# Patient Record
Sex: FEMALE | Employment: OTHER | URBAN - NONMETROPOLITAN AREA
[De-identification: names, ages, dates, MRNs, and addresses within clinical notes are randomized per-mention and may not be internally consistent; named-entity substitution may affect disease eponyms.]

---

## 2020-08-20 ENCOUNTER — VIRTUAL VISIT (OUTPATIENT)
Dept: FAMILY MEDICINE CLINIC | Age: 85
End: 2020-08-20
Payer: MEDICARE

## 2020-08-20 DIAGNOSIS — I10 ESSENTIAL (PRIMARY) HYPERTENSION: ICD-10-CM

## 2020-08-20 DIAGNOSIS — I48.0 PAROXYSMAL ATRIAL FIBRILLATION (HCC): Primary | ICD-10-CM

## 2020-08-20 DIAGNOSIS — F03.90 DEMENTIA WITHOUT BEHAVIORAL DISTURBANCE, UNSPECIFIED DEMENTIA TYPE: ICD-10-CM

## 2020-08-20 DIAGNOSIS — Z86.73 HISTORY OF CVA (CEREBROVASCULAR ACCIDENT): ICD-10-CM

## 2020-08-20 DIAGNOSIS — C44.90 MALIGNANT NEOPLASM OF SKIN: ICD-10-CM

## 2020-08-20 PROCEDURE — 99212 OFFICE O/P EST SF 10 MIN: CPT | Performed by: NURSE PRACTITIONER

## 2020-08-31 PROBLEM — C44.90 MALIGNANT NEOPLASM OF SKIN: Status: ACTIVE | Noted: 2020-08-31

## 2020-08-31 PROBLEM — F03.90 DEMENTIA WITHOUT BEHAVIORAL DISTURBANCE (HCC): Status: ACTIVE | Noted: 2020-08-31

## 2020-08-31 PROBLEM — I48.0 PAROXYSMAL ATRIAL FIBRILLATION (HCC): Status: ACTIVE | Noted: 2020-08-31

## 2020-08-31 PROBLEM — I10 ESSENTIAL (PRIMARY) HYPERTENSION: Status: ACTIVE | Noted: 2020-08-31

## 2020-10-21 ENCOUNTER — HOSPITAL ENCOUNTER (INPATIENT)
Age: 85
LOS: 8 days | Discharge: HOME | DRG: 178 | End: 2020-10-29
Attending: INTERNAL MEDICINE | Admitting: INTERNAL MEDICINE
Payer: MEDICARE

## 2020-10-21 PROBLEM — U07.1 COVID-19: Status: ACTIVE | Noted: 2020-10-21

## 2020-10-21 PROCEDURE — 65270000029 HC RM PRIVATE

## 2020-10-21 PROCEDURE — 65270000034 HC RM STERILE ISOLATION

## 2020-10-21 RX ORDER — SODIUM CHLORIDE 0.9 % (FLUSH) 0.9 %
5-40 SYRINGE (ML) INJECTION AS NEEDED
Status: DISCONTINUED | OUTPATIENT
Start: 2020-10-21 | End: 2020-10-21

## 2020-10-21 RX ORDER — ENOXAPARIN SODIUM 100 MG/ML
30 INJECTION SUBCUTANEOUS DAILY
Status: DISCONTINUED | OUTPATIENT
Start: 2020-10-22 | End: 2020-10-21

## 2020-10-21 RX ORDER — ZINC GLUCONATE 50 MG
100 TABLET ORAL DAILY
Status: DISCONTINUED | OUTPATIENT
Start: 2020-10-22 | End: 2020-10-29 | Stop reason: HOSPADM

## 2020-10-21 RX ORDER — POTASSIUM CHLORIDE 20 MEQ/1
20 TABLET, EXTENDED RELEASE ORAL 2 TIMES DAILY
COMMUNITY

## 2020-10-21 RX ORDER — PROMETHAZINE HYDROCHLORIDE 25 MG/1
12.5 TABLET ORAL
Status: DISCONTINUED | OUTPATIENT
Start: 2020-10-21 | End: 2020-10-29 | Stop reason: HOSPADM

## 2020-10-21 RX ORDER — ATORVASTATIN CALCIUM 10 MG/1
10 TABLET, FILM COATED ORAL DAILY
Status: DISCONTINUED | OUTPATIENT
Start: 2020-10-22 | End: 2020-10-29 | Stop reason: HOSPADM

## 2020-10-21 RX ORDER — MELATONIN
1000 DAILY
COMMUNITY

## 2020-10-21 RX ORDER — MELATONIN 5 MG
10 CAPSULE ORAL
COMMUNITY

## 2020-10-21 RX ORDER — LANOLIN ALCOHOL/MO/W.PET/CERES
400 CREAM (GRAM) TOPICAL DAILY
Status: DISCONTINUED | OUTPATIENT
Start: 2020-10-22 | End: 2020-10-29 | Stop reason: HOSPADM

## 2020-10-21 RX ORDER — CITALOPRAM 20 MG/1
20 TABLET, FILM COATED ORAL DAILY
COMMUNITY

## 2020-10-21 RX ORDER — POTASSIUM CHLORIDE 750 MG/1
20 TABLET, EXTENDED RELEASE ORAL 2 TIMES DAILY
Status: DISCONTINUED | OUTPATIENT
Start: 2020-10-22 | End: 2020-10-22

## 2020-10-21 RX ORDER — ASCORBIC ACID 500 MG
500 TABLET ORAL DAILY
COMMUNITY

## 2020-10-21 RX ORDER — FUROSEMIDE 20 MG/1
20 TABLET ORAL DAILY
Status: DISCONTINUED | OUTPATIENT
Start: 2020-10-22 | End: 2020-10-29 | Stop reason: HOSPADM

## 2020-10-21 RX ORDER — CITALOPRAM 20 MG/1
20 TABLET, FILM COATED ORAL DAILY
Status: DISCONTINUED | OUTPATIENT
Start: 2020-10-22 | End: 2020-10-29 | Stop reason: HOSPADM

## 2020-10-21 RX ORDER — MELATONIN
1000 DAILY
Status: DISCONTINUED | OUTPATIENT
Start: 2020-10-22 | End: 2020-10-29 | Stop reason: HOSPADM

## 2020-10-21 RX ORDER — LANOLIN ALCOHOL/MO/W.PET/CERES
400 CREAM (GRAM) TOPICAL DAILY
COMMUNITY

## 2020-10-21 RX ORDER — POLYETHYLENE GLYCOL 3350 17 G/17G
17 POWDER, FOR SOLUTION ORAL DAILY PRN
Status: DISCONTINUED | OUTPATIENT
Start: 2020-10-21 | End: 2020-10-29 | Stop reason: HOSPADM

## 2020-10-21 RX ORDER — ATORVASTATIN CALCIUM 10 MG/1
10 TABLET, FILM COATED ORAL DAILY
COMMUNITY

## 2020-10-21 RX ORDER — SODIUM CHLORIDE 0.9 % (FLUSH) 0.9 %
5-40 SYRINGE (ML) INJECTION EVERY 8 HOURS
Status: DISCONTINUED | OUTPATIENT
Start: 2020-10-21 | End: 2020-10-23

## 2020-10-21 RX ORDER — ACETAMINOPHEN 325 MG/1
650 TABLET ORAL
Status: DISCONTINUED | OUTPATIENT
Start: 2020-10-21 | End: 2020-10-29 | Stop reason: HOSPADM

## 2020-10-21 RX ORDER — FUROSEMIDE 20 MG/1
20 TABLET ORAL DAILY
COMMUNITY

## 2020-10-21 RX ORDER — ASCORBIC ACID 500 MG
500 TABLET ORAL DAILY
Status: DISCONTINUED | OUTPATIENT
Start: 2020-10-22 | End: 2020-10-29 | Stop reason: HOSPADM

## 2020-10-21 RX ORDER — ACETAMINOPHEN 650 MG/1
650 SUPPOSITORY RECTAL
Status: DISCONTINUED | OUTPATIENT
Start: 2020-10-21 | End: 2020-10-29 | Stop reason: HOSPADM

## 2020-10-21 RX ORDER — MELATONIN 5 MG
5 CAPSULE ORAL
Status: DISCONTINUED | OUTPATIENT
Start: 2020-10-21 | End: 2020-10-22

## 2020-10-21 RX ORDER — ONDANSETRON 2 MG/ML
4 INJECTION INTRAMUSCULAR; INTRAVENOUS
Status: DISCONTINUED | OUTPATIENT
Start: 2020-10-21 | End: 2020-10-29 | Stop reason: HOSPADM

## 2020-10-21 RX ORDER — ZINC GLUCONATE 100 MG
2 TABLET ORAL DAILY
COMMUNITY

## 2020-10-22 LAB
ANION GAP SERPL CALC-SCNC: 8 MMOL/L
BASOPHILS # BLD: 0 K/UL (ref 0–0.1)
BASOPHILS NFR BLD: 0 % (ref 0–2)
BUN SERPL-MCNC: 16 MG/DL (ref 9–21)
BUN/CREAT SERPL: ABNORMAL
CA-I BLD-MCNC: 8.6 MG/DL (ref 8.5–10.5)
CHLORIDE SERPL-SCNC: 98 MMOL/L (ref 94–111)
CO2 SERPL-SCNC: 27 MMOL/L (ref 21–33)
CREAT SERPL-MCNC: <0.15 MG/DL (ref 0.7–1.2)
EOSINOPHIL # BLD: 0 K/UL (ref 0–0.4)
EOSINOPHIL NFR BLD: 1 % (ref 0–5)
ERYTHROCYTE [DISTWIDTH] IN BLOOD BY AUTOMATED COUNT: 15.4 % (ref 11.6–14.5)
GLUCOSE SERPL-MCNC: 96 MG/DL (ref 70–110)
HCT VFR BLD AUTO: 41.5 % (ref 35–45)
HGB BLD-MCNC: 13.4 G/DL (ref 12–16)
IMM GRANULOCYTES # BLD AUTO: 0 K/UL
IMM GRANULOCYTES NFR BLD AUTO: 0 %
LYMPHOCYTES # BLD: 1.2 K/UL (ref 0.9–3.6)
LYMPHOCYTES NFR BLD: 28 % (ref 21–52)
MCH RBC QN AUTO: 29.2 PG (ref 24–34)
MCHC RBC AUTO-ENTMCNC: 32.3 G/DL (ref 31–37)
MCV RBC AUTO: 90.4 FL (ref 74–97)
MONOCYTES # BLD: 0.3 K/UL (ref 0.05–1.2)
MONOCYTES NFR BLD: 7 % (ref 3–10)
NEUTS SEG # BLD: 2.7 K/UL (ref 1.8–8)
NEUTS SEG NFR BLD: 64 % (ref 40–73)
PLATELET # BLD AUTO: 170 K/UL (ref 135–420)
PMV BLD AUTO: 10.8 FL (ref 9.2–11.8)
POTASSIUM SERPL-SCNC: 5.2 MMOL/L (ref 3.2–5.1)
RBC # BLD AUTO: 4.59 M/UL (ref 4.2–5.3)
SODIUM SERPL-SCNC: 133 MMOL/L (ref 135–145)
WBC # BLD AUTO: 4.3 K/UL (ref 4.6–13.2)

## 2020-10-22 PROCEDURE — 65270000029 HC RM PRIVATE

## 2020-10-22 PROCEDURE — 74011250637 HC RX REV CODE- 250/637: Performed by: NURSE PRACTITIONER

## 2020-10-22 PROCEDURE — 85025 COMPLETE CBC W/AUTO DIFF WBC: CPT

## 2020-10-22 PROCEDURE — 36415 COLL VENOUS BLD VENIPUNCTURE: CPT

## 2020-10-22 PROCEDURE — 80048 BASIC METABOLIC PNL TOTAL CA: CPT

## 2020-10-22 PROCEDURE — 74011250637 HC RX REV CODE- 250/637: Performed by: INTERNAL MEDICINE

## 2020-10-22 PROCEDURE — 65270000034 HC RM STERILE ISOLATION

## 2020-10-22 RX ORDER — LISINOPRIL 5 MG/1
5 TABLET ORAL DAILY
Status: DISCONTINUED | OUTPATIENT
Start: 2020-10-23 | End: 2020-10-22

## 2020-10-22 RX ORDER — SODIUM POLYSTYRENE SULFONATE 15 G/60ML
30 SUSPENSION ORAL; RECTAL
Status: ACTIVE | OUTPATIENT
Start: 2020-10-22 | End: 2020-10-22

## 2020-10-22 RX ORDER — LISINOPRIL 5 MG/1
5 TABLET ORAL DAILY
Status: DISCONTINUED | OUTPATIENT
Start: 2020-10-22 | End: 2020-10-25

## 2020-10-22 RX ORDER — LANOLIN ALCOHOL/MO/W.PET/CERES
9 CREAM (GRAM) TOPICAL
Status: DISCONTINUED | OUTPATIENT
Start: 2020-10-22 | End: 2020-10-29 | Stop reason: HOSPADM

## 2020-10-22 RX ADMIN — Medication 9 MG: at 23:53

## 2020-10-22 NOTE — PROGRESS NOTES
Arrived on unit, via EP inpatient bed, with assistance x3 staff. Assumed care of patient. Initial assessment completed.

## 2020-10-22 NOTE — PROGRESS NOTES
Pt. Refusing to take PO medications at this time. Pt. Will open eyes to verbal stimuli but is drowsy. Pt. Not interested in drinking water or eating food. Brief dry. Pt. Repositioned. Nurse notified Emmett Burleson NP about patient not tolerating oral medications.

## 2020-10-22 NOTE — ASSESSMENT & PLAN NOTE
Admit to inpatient  No O2 demand, continue to monitor  Pt asymptomatic, therefore, will hold COVID-specific supplements and initiate as needed  Supportive care  Continue Isolation precautions

## 2020-10-22 NOTE — PROGRESS NOTES
Comprehensive Nutrition Assessment    Type and Reason for Visit: Initial    Nutrition Recommendations/Plan: Mechanical soft diet with Ensure Enlive at breakfast and Magic Cup at lunch and supper. Nutrition Assessment:  Pt requires mechanical soft texture due to poor dentition and difficulty masticating meat and tough foods. Recommend continue diet as per [facility] record- Mechanical soft with Magic Cup BID. Recommend add Ensure Enlive at Breakfast 350 calories 20 gm protein to help meet protein needs. Pt refused Ensure in the past due to supplement fatigue, but it has been >1 year since trial.    Malnutrition Assessment:  Malnutrition Status: Moderate malnutrition    Context:  Chronic illness     Findings of the 6 clinical characteristics of malnutrition:   Energy Intake:     Weight Loss: Body Fat Loss:   ,     Muscle Mass Loss:   ,    Fluid Accumulation:   ,     Strength:            Estimated Daily Nutrient Needs:  Energy (kcal):  0572-8052 Kcal/d (30-35 Kcal/kg)  Protein (g):  44-56 gm/d (1-1.25 gm/kg)       Fluid (ml/day):  1560- 1760 ml/d ( 35-40 ml/kg)    Nutrition Related Findings:  K 5.2- high, was on K supplement which has been discontinued. Na 133- may improve with K improvement.       Wounds:    None       Current Nutrition Therapies:  DIET MECHANICAL SOFT  DIET NUTRITIONAL SUPPLEMENTS Lunch, Dinner; Magic Cups  DIET NUTRITIONAL SUPPLEMENTS Breakfast; Ensure Enlive    Anthropometric Measures:  · Height:  5' 3\" (160 cm)  · Current Body Wt:  (unknown - doubt weight of 116 pounds.)   · Admission Body Wt:       · Usual Body Wt:  98 lb     · Ideal Body Wt:  115 lbs:      · Adjusted Body Weight:   ; Weight Adjustment for: No adjustment   · Adjusted BMI:       · BMI Category:  Underweight (BMI less than 22) age over 72       Nutrition Diagnosis:   · Inadequate protein-energy intake, Moderate malnutrition, Underweight, Altered nutrition-related lab values related to biting/chewing (masticatory) difficulty, early satiety as evidenced by BMI, severe muscle loss, lab values      Nutrition Interventions:   Food and/or Nutrient Delivery: Modify current diet, Start oral nutrition supplement  Nutrition Education and Counseling: Education not indicated  Coordination of Nutrition Care: Continued inpatient monitoring    Goals:  Maintain current intake, skin integrity and K, Na  WNL.        Nutrition Monitoring and Evaluation:   Behavioral-Environmental Outcomes:    Food/Nutrient Intake Outcomes: Food and nutrient intake, Supplement intake  Physical Signs/Symptoms Outcomes: Biochemical data, Skin    Discharge Planning:    No discharge needs at this time     Electronically signed by Linda Deleon on 10/22/2020 at 5:09 PM    Contact: [phone]

## 2020-10-22 NOTE — PROGRESS NOTES
Nurse changed patient brief at this time. Nurse noted a lg. Mass in genital area. Brief changed of. Lg. Chastity urine and sm stool.

## 2020-10-22 NOTE — PROGRESS NOTES
Progress Note    Patient: Yuri Domingo MRN: [id number]     YOB: 1931  Age: 80 y.o. Sex: female      Admit Date: 10/21/2020    LOS: 3day     80year old  female from  seen for + Covid result. On assessment, patient asleep in bed, aroused to verbal stimuli, alert and oriented X 1. No signs of distress, discomfort, or pain. Vitals remained stable and no acute events overnight. BP elevated at 148/93, will begin Lisinopril 5 mg daily. Will continue to assess patient. Unable to discuss plan of care with pt due to dementia. Subjective:     Unable to assess. Objective:     Vitals:    10/22/20 0129 10/22/20 0400 10/22/20 1123   BP: 127/72  (!) 148/93   Pulse: 91  74   Resp: 20  20   Temp: 97.1 °F (36.2 °C)  97.8 °F (36.6 °C)   SpO2: 94%     Weight:  52.6 kg (116 lb)    Height:  5' 3\" (1.6 m)         Intake and Output:  Current Shift: No intake/output data recorded. Last three shifts: No intake/output data recorded. Physical Exam:   - GENERAL: Alert and oriented x 1. No acute distress. Cachetic. Very thin ill appearing.     - HEENT: EOMI. Anicteric. PERRLA,Moist mucous membranes. No scleral icterus. No cervical lymphadenopathy. Oropharynx moist without any lesions    -NECK:  No tracheal deviation, no JVD, no significant lymphadenopathy, no thyromegaly noted. - LUNGS: Clear to auscultation bilaterally. No accessory muscle use. Chest symmetrical, No wheezing, rales, rhonchi noted. Appropriate respiratory effort.     - CARDIOVASCULAR: Regular rate and rhythm. No murmur, rubs, gallops, No edema appreciated. S1 & S2 audible. - ABDOMEN: Soft, non-tender and non-distended. No palpable masses. , lesions, hepatomegaly. Bowels active X4 quadrants. - SKIN: Warm, dry, intact, scattered bruising, protruding cancerous skin lesion to right face,no  rashes noted. Color appropriate for ethnicity.     - MUSCULOSKELETAL: Unable to assess.    - NEUROLOGIC: Unable to assess.     - PSYCHIATRIC: Calm & Cooperative. Appropriate mood and affect. Lab/Data Review:  Recent Results (from the past 12 hour(s))   METABOLIC PANEL, BASIC    Collection Time: 10/22/20  3:55 AM   Result Value Ref Range    Sodium 133 (L) 135 - 145 mmol/L    Potassium 5.2 (H) 3.2 - 5.1 mmol/L    Chloride 98 94 - 111 mmol/L    CO2 27 21 - 33 mmol/L    Anion gap 8 mmol/L    Glucose 96 70 - 110 mg/dL    BUN 16 9 - 21 mg/dL    Creatinine <0.15 (L) 0.70 - 1.20 mg/dL    BUN/Creatinine ratio Not calculated      GFR est AA Not calculated ml/min/1.73m2    GFR est non-AA Not calculated ml/min/1.73m2    Calcium 8.6 8.5 - 10.5 mg/dL   CBC WITH AUTOMATED DIFF    Collection Time: 10/22/20  3:55 AM   Result Value Ref Range    WBC 4.3 (L) 4.6 - 13.2 K/uL    RBC 4.59 4.20 - 5.30 M/uL    HGB 13.4 12.0 - 16.0 g/dL    HCT 41.5 35.0 - 45.0 %    MCV 90.4 74.0 - 97.0 FL    MCH 29.2 24.0 - 34.0 PG    MCHC 32.3 31.0 - 37.0 g/dL    RDW 15.4 (H) 11.6 - 14.5 %    PLATELET 238 773 - 049 K/uL    MPV 10.8 9.2 - 11.8 FL    NEUTROPHILS 64 40 - 73 %    LYMPHOCYTES 28 21 - 52 %    MONOCYTES 7 3 - 10 %    EOSINOPHILS 1 0 - 5 %    BASOPHILS 0 0 - 2 %    IMMATURE GRANULOCYTES 0 %    ABS. NEUTROPHILS 2.7 1.8 - 8.0 K/UL    ABS. LYMPHOCYTES 1.2 0.9 - 3.6 K/UL    ABS. MONOCYTES 0.3 0.05 - 1.2 K/UL    ABS. EOSINOPHILS 0.0 0.0 - 0.4 K/UL    ABS. BASOPHILS 0.0 0.0 - 0.1 K/UL    ABS. IMM.  GRANS. 0.0 K/UL          Assessment/Plan:     Essential (primary) hypertension  Resume home regimen accordingly    Dementia without behavioral disturbance (HCC)  Monitor and provide supportive care    Paroxysmal atrial fibrillation ([address]  Resume home regimen to include Eliquis and monitor    * COVID-19  Admit to inpatient  No O2 demand, continue to monitor  Pt asymptomatic, therefore, will hold COVID-specific supplements and initiate as needed  Supportive care  Continue Isolation precautions        Signed By: Debbie Rachel NP     October 22, 2020

## 2020-10-22 NOTE — H&P
History and Physical    Patient: Darwin Herr MRN: [id number]      YOB: 1931  Age: 80 y.o. Sex: female      Subjective:      Darwin Herr is a 80 y.o. female with a history of dementia, HTN who presented to [facility] from [facility] diagnosis of positive COVID-19. Patient unable to provide any information secondary to dementia. Per nursing report, there are no physical complaints. She has been admitted for further evaluation and treatment of COVID-19. Past Medical History:   Diagnosis Date    Atrial fibrillation ([address]     Hypertension     Psychiatric disorder      No past surgical history on file. No family history on file. Social History     Tobacco Use    Smoking status: Not on file   Substance Use Topics    Alcohol use: Not Currently      Allergies not on file  Immunizations are UTD per pt. Patient will be admitted for COVID-19 [U07.1]  to hospitalist service as Inpatient and evaluated daily via physical assessment, diagnostic testing, and laboratory assessment. Review of Systems:   Unable to obtain review of systems secondary to dementia      Objective: There were no vitals filed for this visit. Physical Exam:  - GENERAL: Alert, disoriented at baseline. No acute distress. ?- HEENT: EOMI. Anicteric. PERRLA,Moist mucous membranes. No scleral icterus. Oropharynx moist without any lesions    -NECK: Supple, no tracheal deviation, no JVD, no significant lymphadenopathy, no thyromegaly noted. ?- LUNGS: Clear to auscultation bilaterally. No accessory muscle use. ? Chest symmetrical, No wheezing, rales, rhonchi noted. Appropriate respiratory effort.     - CARDIOVASCULAR: Regular rate and rhythm. No murmur, rubs, gallops, No edema appreciated. S1 & S2 audible. - ABDOMEN: Soft and non-distended. No palpable masses. , lesions, hepatomegaly. Bowels active X4 quadrants. - SKIN: Warm, dry. Mepilex noted to sternal region.   Bruising noted to upper extremities bilaterally. - MUSCULOSKELETAL: No deformities noted    - NEUROLOGIC: No focal neurological deficits.     - PSYCHIATRIC: Calm, non verbal during visit. No results found for this or any previous visit (from the past 12 hour(s)).          Assessment/Plan:   COVID-19  Admit to inpatient  No O2 demand, continue to monitor  Pt asymptomatic, therefore, will hold COVID-specific supplements and initiate as needed  Supportive care  Continue Isolation precautions    Essential (primary) hypertension  Resume home regimen accordingly    Dementia without behavioral disturbance (HCC)  Monitor and provide supportive care    Paroxysmal atrial fibrillation ([facility] [address]  Resume home regimen to include Eliquis and monitor     Signed By: Lindalee Libman, NP     October 21, 2020

## 2020-10-23 LAB
ANION GAP SERPL CALC-SCNC: 7 MMOL/L
BASOPHILS # BLD: 0 K/UL (ref 0–0.1)
BASOPHILS NFR BLD: 1 % (ref 0–2)
BUN SERPL-MCNC: 16 MG/DL (ref 9–21)
BUN/CREAT SERPL: 40
CA-I BLD-MCNC: 8.9 MG/DL (ref 8.5–10.5)
CHLORIDE SERPL-SCNC: 100 MMOL/L (ref 94–111)
CO2 SERPL-SCNC: 29 MMOL/L (ref 21–33)
CREAT SERPL-MCNC: 0.4 MG/DL (ref 0.7–1.2)
EOSINOPHIL # BLD: 0.1 K/UL (ref 0–0.4)
EOSINOPHIL NFR BLD: 1 % (ref 0–5)
ERYTHROCYTE [DISTWIDTH] IN BLOOD BY AUTOMATED COUNT: 15.4 % (ref 11.6–14.5)
GLUCOSE SERPL-MCNC: 73 MG/DL (ref 70–110)
HCT VFR BLD AUTO: 43 % (ref 35–45)
HGB BLD-MCNC: 13.7 G/DL (ref 12–16)
IMM GRANULOCYTES # BLD AUTO: 0 K/UL
IMM GRANULOCYTES NFR BLD AUTO: 0 %
LYMPHOCYTES # BLD: 1.3 K/UL (ref 0.9–3.6)
LYMPHOCYTES NFR BLD: 32 % (ref 21–52)
MCH RBC QN AUTO: 29 PG (ref 24–34)
MCHC RBC AUTO-ENTMCNC: 31.9 G/DL (ref 31–37)
MCV RBC AUTO: 91.1 FL (ref 74–97)
MONOCYTES # BLD: 0.4 K/UL (ref 0.05–1.2)
MONOCYTES NFR BLD: 9 % (ref 3–10)
NEUTS SEG # BLD: 2.3 K/UL (ref 1.8–8)
NEUTS SEG NFR BLD: 57 % (ref 40–73)
PLATELET # BLD AUTO: 168 K/UL (ref 135–420)
PMV BLD AUTO: 11.1 FL (ref 9.2–11.8)
POTASSIUM SERPL-SCNC: 4.5 MMOL/L (ref 3.2–5.1)
RBC # BLD AUTO: 4.72 M/UL (ref 4.2–5.3)
SODIUM SERPL-SCNC: 136 MMOL/L (ref 135–145)
WBC # BLD AUTO: 4 K/UL (ref 4.6–13.2)

## 2020-10-23 PROCEDURE — 85025 COMPLETE CBC W/AUTO DIFF WBC: CPT

## 2020-10-23 PROCEDURE — 80048 BASIC METABOLIC PNL TOTAL CA: CPT

## 2020-10-23 PROCEDURE — 36415 COLL VENOUS BLD VENIPUNCTURE: CPT

## 2020-10-23 PROCEDURE — 65270000029 HC RM PRIVATE

## 2020-10-23 PROCEDURE — 74011250637 HC RX REV CODE- 250/637: Performed by: NURSE PRACTITIONER

## 2020-10-23 PROCEDURE — 65270000034 HC RM STERILE ISOLATION

## 2020-10-23 RX ADMIN — APIXABAN 2.5 MG: 2.5 TABLET, FILM COATED ORAL at 17:07

## 2020-10-23 NOTE — PROGRESS NOTES
Patient brief dry at this time. Patient opening eyes to verbal stimuli. Patient tolerated small amount of ensure at this time. Patient repositioned for comfort.

## 2020-10-23 NOTE — PROGRESS NOTES
Patient repositioned at this time. Patient did not tolerated any PO intake during lunch. Nurse holding medications due patient not tolerating but a sip of water at a time. Patient very lethargic.

## 2020-10-23 NOTE — PROGRESS NOTES
PATIENT RESTING QUIETLY IN BED AND AROUSES EASILY TO NAME CALLED. FULL BODY ASSESSMENT COMPLETED. SKIN WARM AND DRY. RESPIRATIONS EASY AND UNLABORED. PATIENT REORIENTED TO TIME AND PLACE BY NURSE. DIPER DRY AT THIS TIME. CALL BELL IN REACH. SIDE RAILS UP X2 AND BED IN LOWEST POSITIONPATIENT POSITIONED IN BED FOR COMFORT. Kat Azevedo

## 2020-10-23 NOTE — PROGRESS NOTES
Progress Note    Patient: Alex Frank MRN: [id number]     YOB: 1931  Age: 80 y.o. Sex: female      Admit Date: 10/21/2020    LOS: 2 days     80year old  female from  seen for + Covid result. On assessment, patient asleep in bed, aroused to verbal stimuli, alert and oriented X 1. No signs of distress, discomfort, or pain. Vitals remained stable and no acute events overnight. BP elevated at 155/80, have not had morning medications. Will continue to assess patient. Unable to discuss plan of care with pt due to dementia. Subjective:     Unable to assess. Objective:     Vitals:    10/23/20 0131 10/23/20 0442 10/23/20 0454 10/23/20 1031   BP: 137/60 136/77 135/78 (!) 155/80   Pulse: 71 78 72 94   Resp: 20 20 18 18   Temp: 97.8 °F (36.6 °C) 97.6 °F (36.4 °C) 97.7 °F (36.5 °C) 97.5 °F (36.4 °C)   SpO2: 95% 95% 96%    Weight:       Height:            Intake and Output:  Current Shift: No intake/output data recorded. Last three shifts: No intake/output data recorded. Physical Exam:   - GENERAL: Alert and oriented x 1. No acute distress. Cachetic. Very thin ill appearing.     - HEENT: EOMI. Anicteric. PERRLA,Moist mucous membranes. No scleral icterus. No cervical lymphadenopathy. Oropharynx moist without any lesions    -NECK:  No tracheal deviation, no JVD, no significant lymphadenopathy, no thyromegaly noted. - LUNGS: Clear but diminished to auscultation bilaterally. No accessory muscle use. Chest symmetrical, No wheezing, rales, rhonchi noted. Appropriate respiratory effort.     - CARDIOVASCULAR: Regular rate and rhythm. No murmur, rubs, gallops, No edema appreciated. S1 & S2 audible. - ABDOMEN: Soft, non-tender and non-distended. No palpable masses. , lesions, hepatomegaly. Bowels active X4 quadrants. - SKIN: Warm, dry, intact, scattered bruising, protruding cancerous skin lesion to right face,no  rashes noted. Color appropriate for ethnicity.      - MUSCULOSKELETAL: Unable to assess.    - NEUROLOGIC: Unable to assess.    - PSYCHIATRIC: Calm & Cooperative. Appropriate mood and affect. Lab/Data Review:  Recent Results (from the past 12 hour(s))   METABOLIC PANEL, BASIC    Collection Time: 10/23/20  4:00 AM   Result Value Ref Range    Sodium 136 135 - 145 mmol/L    Potassium 4.5 3.2 - 5.1 mmol/L    Chloride 100 94 - 111 mmol/L    CO2 29 21 - 33 mmol/L    Anion gap 7 mmol/L    Glucose 73 70 - 110 mg/dL    BUN 16 9 - 21 mg/dL    Creatinine 0.40 (L) 0.70 - 1.20 mg/dL    BUN/Creatinine ratio 40      GFR est AA >60 ml/min/1.73m2    GFR est non-AA >60 ml/min/1.73m2    Calcium 8.9 8.5 - 10.5 mg/dL   CBC WITH AUTOMATED DIFF    Collection Time: 10/23/20  4:00 AM   Result Value Ref Range    WBC 4.0 (L) 4.6 - 13.2 K/uL    RBC 4.72 4.20 - 5.30 M/uL    HGB 13.7 12.0 - 16.0 g/dL    HCT 43.0 35.0 - 45.0 %    MCV 91.1 74.0 - 97.0 FL    MCH 29.0 24.0 - 34.0 PG    MCHC 31.9 31.0 - 37.0 g/dL    RDW 15.4 (H) 11.6 - 14.5 %    PLATELET 894 988 - 744 K/uL    MPV 11.1 9.2 - 11.8 FL    NEUTROPHILS 57 40 - 73 %    LYMPHOCYTES 32 21 - 52 %    MONOCYTES 9 3 - 10 %    EOSINOPHILS 1 0 - 5 %    BASOPHILS 1 0 - 2 %    IMMATURE GRANULOCYTES 0 %    ABS. NEUTROPHILS 2.3 1.8 - 8.0 K/UL    ABS. LYMPHOCYTES 1.3 0.9 - 3.6 K/UL    ABS. MONOCYTES 0.4 0.05 - 1.2 K/UL    ABS. EOSINOPHILS 0.1 0.0 - 0.4 K/UL    ABS. BASOPHILS 0.0 0.0 - 0.1 K/UL    ABS. IMM.  GRANS. 0.0 K/UL          Assessment/Plan:     Essential (primary) hypertension  Resume home regimen accordingly    Dementia without behavioral disturbance (HCC)  Monitor and provide supportive care    Paroxysmal atrial fibrillation ([address]  Resume home regimen to include Eliquis and monitor    * COVID-19  Admit to inpatient  No O2 demand, continue to monitor  Pt asymptomatic, therefore, will hold COVID-specific supplements and initiate as needed  Supportive care  Continue Isolation precautions      Signed By: Debbie Rachel NP     October 23, 2020

## 2020-10-23 NOTE — PROGRESS NOTES
Complete bed bath provided. Brief chnaged of sm ursula urine. VSS. Mouth care provided. Linens changed. Patient has pillows under both hips at this time. Will continue to monitor.

## 2020-10-23 NOTE — PROGRESS NOTES
RESTING QUIETLY AND AROUSES EASILY FOR VITAL SIGNS.BLOOD DRAWN WITHOUT DIFFICULTY AND SPECIMEN CARRIED TO LAB TO BE TESTED. PATIENT POSITIONED FOR COMFORT. DIPER WET AND INCONTIENT OF SMALL SOFT BROWN STOOL. PATIENT CLEANED AND FRESH DIPPER PLACE ON PATIENT.

## 2020-10-24 LAB
ANION GAP SERPL CALC-SCNC: 12 MMOL/L
BASOPHILS # BLD: 0.1 K/UL
BASOPHILS NFR BLD: 1 %
BUN SERPL-MCNC: 23 MG/DL (ref 9–21)
BUN/CREAT SERPL: 58
CA-I BLD-MCNC: 8.9 MG/DL (ref 8.5–10.5)
CHLORIDE SERPL-SCNC: 101 MMOL/L (ref 94–111)
CO2 SERPL-SCNC: 22 MMOL/L (ref 21–33)
CREAT SERPL-MCNC: 0.4 MG/DL (ref 0.7–1.2)
DIFFERENTIAL METHOD BLD: ABNORMAL
EOSINOPHIL # BLD: 0.1 K/UL
EOSINOPHIL NFR BLD: 1 %
ERYTHROCYTE [DISTWIDTH] IN BLOOD BY AUTOMATED COUNT: 15.6 % (ref 11.6–14.5)
GLUCOSE SERPL-MCNC: 100 MG/DL (ref 70–110)
HCT VFR BLD AUTO: 46.4 % (ref 35–45)
HGB BLD-MCNC: 15.1 G/DL (ref 12–16)
IMM GRANULOCYTES # BLD AUTO: 0 K/UL
IMM GRANULOCYTES NFR BLD AUTO: 0 %
LYMPHOCYTES # BLD: 1.4 K/UL
LYMPHOCYTES NFR BLD: 22 %
MCH RBC QN AUTO: 29.5 PG (ref 24–34)
MCHC RBC AUTO-ENTMCNC: 32.5 G/DL (ref 31–37)
MCV RBC AUTO: 90.6 FL (ref 74–97)
MONOCYTES # BLD: 0.3 K/UL
MONOCYTES NFR BLD: 4 %
NEUTS SEG # BLD: 4.6 K/UL
NEUTS SEG NFR BLD: 72 %
PLATELET # BLD AUTO: 169 K/UL (ref 135–420)
PMV BLD AUTO: 10.7 FL (ref 9.2–11.8)
POTASSIUM SERPL-SCNC: 4.7 MMOL/L (ref 3.2–5.1)
RBC # BLD AUTO: 5.12 M/UL (ref 4.2–5.3)
RBC MORPH BLD: ABNORMAL
SODIUM SERPL-SCNC: 135 MMOL/L (ref 135–145)
WBC # BLD AUTO: 6.5 K/UL (ref 4.6–13.2)

## 2020-10-24 PROCEDURE — 80048 BASIC METABOLIC PNL TOTAL CA: CPT

## 2020-10-24 PROCEDURE — 65270000029 HC RM PRIVATE

## 2020-10-24 PROCEDURE — 74011250637 HC RX REV CODE- 250/637: Performed by: NURSE PRACTITIONER

## 2020-10-24 PROCEDURE — 36415 COLL VENOUS BLD VENIPUNCTURE: CPT

## 2020-10-24 PROCEDURE — 65270000034 HC RM STERILE ISOLATION

## 2020-10-24 PROCEDURE — 85025 COMPLETE CBC W/AUTO DIFF WBC: CPT

## 2020-10-24 RX ADMIN — APIXABAN 2.5 MG: 2.5 TABLET, FILM COATED ORAL at 17:46

## 2020-10-24 NOTE — PROGRESS NOTES
Assumed care of pt. VS and assessment completed. Pt remains on droplet plus isolation for COVID 19. Afebrile at this time. Pt is alert. Remains in constant state of confusion. Does not communicate verbally. Green and purple top drawn and sent to lab. Pt remains DNR. Bed locked and low. CBWR.

## 2020-10-24 NOTE — PROGRESS NOTES
Pt has very poor appetite and is not interested in consuming any food. Offered all items on tray for breakfast and lunch and pt will turn her head away and or/ take a small bite and spit out all food. Pt also took pills crushed in applesauce, then spit them out. Repeated attempts to give meds unsuccessful. Pt will not stop spitting meds/drinks/food out. Bed locked and low. Brief clean and dry. Will re attempt feeding at dinnertime.

## 2020-10-24 NOTE — PROGRESS NOTES
Pt. Resting quietly in bed, brief changed of incontinent episode of urine. VS taken, assessment completed. Bed in lowest position. Pt. Took small sips of water but would not take any ice cream when offered. Will continue to monitor.

## 2020-10-24 NOTE — PROGRESS NOTES
Progress Note  Date:10/24/2020       Room:[facility] [place]  Patient Jose Cruz Child     YOB: 1931     Age:88 y.o. Subjective    80year old  female from  seen for + Covid result. On assessment, patient asleep in bed, aroused to verbal stimuli, alert and oriented X 1. No signs of distress, discomfort, or pain. Vitals remained stable and no acute events overnight. BP improved from yesterday at 142/78, while continuing to be difficult with respect to taking medications and eating. Will tolerate small amounts of Ensure. Review of Systems   Unable to perform ROS: Dementia       Objective           Vitals Last 24 Hours:  Patient Vitals for the past 24 hrs:   Temp Pulse Resp BP   10/24/20 0800 97.7 °F (36.5 °C) 86 16 (!) 142/78   10/23/20 2048 97.9 °F (36.6 °C) 79 18 (!) 148/82        I/O (24Hr): Intake/Output Summary (Last 24 hours) at 10/24/2020 1557  Last data filed at 10/24/2020 0645  Gross per 24 hour   Intake 100 ml   Output    Net 100 ml       Physical Exam:  General Appearance:  Comfortable, No acute distress. Frail, noncommunicative  HEENT: NCAT, PERRL, No deformities or discharge, Neck supple with trachea midline. Respiratory: Normal respiratory rate. Breath sounds clear to auscultation. Heart: S1 normal and S2 normal.  No tachycardia  Abdomen: Soft and flat. Bowel sounds are normal. No rebound or guarding. No organomegaly. Extremities: Normal range of motion. No acute deformities. No peripheral edema. Pulses: Distal pulses are intact. Neurological: Oriented to self and grossly intact  Skin:  Warm and dry. No acute lesions.     Medications           Current Facility-Administered Medications   Medication Dose Route Frequency    melatonin tablet 9 mg  9 mg Oral QHS    lisinopriL (PRINIVIL, ZESTRIL) tablet 5 mg  5 mg Oral DAILY    acetaminophen (TYLENOL) tablet 650 mg  650 mg Oral Q6H PRN    Or    acetaminophen (TYLENOL) suppository 650 mg  650 mg Rectal Q6H PRN    polyethylene glycol (MIRALAX) packet 17 g  17 g Oral DAILY PRN    promethazine (PHENERGAN) tablet 12.5 mg  12.5 mg Oral Q6H PRN    Or    ondansetron (ZOFRAN) injection 4 mg  4 mg IntraVENous Q6H PRN    apixaban (ELIQUIS) tablet 2.5 mg  2.5 mg Oral BID    ascorbic acid (vitamin C) (VITAMIN C) tablet 500 mg  500 mg Oral DAILY    atorvastatin (LIPITOR) tablet 10 mg  10 mg Oral DAILY    cholecalciferol (VITAMIN D3) (1000 Units /25 mcg) tablet 1 Tab  1,000 Units Oral DAILY    citalopram (CELEXA) tablet 20 mg  20 mg Oral DAILY    furosemide (LASIX) tablet 20 mg  20 mg Oral DAILY    magnesium oxide (MAG-OX) tablet 400 mg  400 mg Oral DAILY    multivitamin, tx-iron-ca-min (THERA-M w/ IRON) tablet 1 Tab  1 Tab Oral DAILY    zinc gluconate tablet 100 mg  100 mg Oral DAILY         Allergies         Patient has no known allergies. Labs/Imaging/Diagnostics      Labs:  Recent Results (from the past 24 hour(s))   METABOLIC PANEL, BASIC    Collection Time: 10/24/20 12:45 AM   Result Value Ref Range    Sodium 135 135 - 145 mmol/L    Potassium 4.7 3.2 - 5.1 mmol/L    Chloride 101 94 - 111 mmol/L    CO2 22 21 - 33 mmol/L    Anion gap 12 mmol/L    Glucose 100 70 - 110 mg/dL    BUN 23 (H) 9 - 21 mg/dL    Creatinine 0.40 (L) 0.70 - 1.20 mg/dL    BUN/Creatinine ratio 58      GFR est AA >60 ml/min/1.73m2    GFR est non-AA >60 ml/min/1.73m2    Calcium 8.9 8.5 - 10.5 mg/dL   CBC WITH AUTOMATED DIFF    Collection Time: 10/24/20 12:45 AM   Result Value Ref Range    WBC 6.5 4.6 - 13.2 K/uL    RBC 5.12 4.20 - 5.30 M/uL    HGB 15.1 12.0 - 16.0 g/dL    HCT 46.4 (H) 35.0 - 45.0 %    MCV 90.6 74.0 - 97.0 FL    MCH 29.5 24.0 - 34.0 PG    MCHC 32.5 31.0 - 37.0 g/dL    RDW 15.6 (H) 11.6 - 14.5 %    PLATELET 561 701 - 263 K/uL    MPV 10.7 9.2 - 11.8 FL    NEUTROPHILS 72 %    LYMPHOCYTES 22 %    MONOCYTES 4 %    EOSINOPHILS 1 %    BASOPHILS 1 %    IMMATURE GRANULOCYTES 0 %    ABS. NEUTROPHILS 4.6 K/UL    ABS.  LYMPHOCYTES 1.4 K/UL ABS. MONOCYTES 0.3 K/UL    ABS. EOSINOPHILS 0.1 K/UL    ABS. BASOPHILS 0.1 K/UL    ABS. IMM. GRANS. 0.0 K/UL    DF Manual      RBC COMMENTS Normocytic, Normochromic          Trended key labs include:  Recent Labs     10/24/20  0045 10/23/20  0400 10/22/20  0355   WBC 6.5 4.0* 4.3*   HGB 15.1 13.7 13.4   HCT 46.4* 43.0 41.5    168 170     Recent Labs     10/24/20  0045 10/23/20  0400 10/22/20  0355    136 133*   K 4.7 4.5 5.2*    100 98   CO2 22 29 27    73 96   BUN 23* 16 16   CREA 0.40* 0.40* <0.15*   CA 8.9 8.9 8.6       Imaging Last 24 Hours:  No results found.     Assessment//Plan           Patient Active Problem List    Diagnosis Date Noted    COVID-19 10/21/2020    Paroxysmal atrial fibrillation ([facility] [address] 08/31/2020    Dementia without behavioral disturbance ([facility] [address] 08/31/2020    Essential (primary) hypertension 08/31/2020    Malignant neoplasm of skin 08/31/2020        COVID-19  -Continue as inpatient  -Continues to not require oxygen support, continue to monitor  -Continue with treatment per Covid protocol  -Supportive care  -Continue Isolation precautions    Essential (primary) hypertension  -Continue home medications  -Continue with lisinopril 5mg daily    Dementia without behavioral disturbance (HCC)  -Monitor and provide supportive care    Paroxysmal atrial fibrillation ([facility] [address]  -Continue with home medication and anticoagulation with Eliquis  -Rate controlled      Code status: Full   Prophylaxis: SCD and Eliquis    Clinical time 25 minutes with >50% of visit spent in counseling and coordination of care      Electronically signed by Oxana Blanchard, PhD, PA-C on 10/24/2020 at 3:57 PM

## 2020-10-24 NOTE — PROGRESS NOTES
Complete bed bath and linen change completed. JAZMYN Conn and NGUYEN Sarmiento LPN both attempted to obtain labs unsuccessfully. Will pass on to oncoming nurse.

## 2020-10-25 PROCEDURE — 65270000029 HC RM PRIVATE

## 2020-10-25 PROCEDURE — 74011250637 HC RX REV CODE- 250/637: Performed by: NURSE PRACTITIONER

## 2020-10-25 PROCEDURE — 74011250637 HC RX REV CODE- 250/637: Performed by: INTERNAL MEDICINE

## 2020-10-25 RX ORDER — LISINOPRIL 5 MG/1
10 TABLET ORAL DAILY
Status: DISCONTINUED | OUTPATIENT
Start: 2020-10-26 | End: 2020-10-29 | Stop reason: HOSPADM

## 2020-10-25 RX ADMIN — ATORVASTATIN CALCIUM 10 MG: 10 TABLET, FILM COATED ORAL at 14:02

## 2020-10-25 RX ADMIN — Medication 1 TABLET: at 14:02

## 2020-10-25 RX ADMIN — MULTIPLE VITAMINS W/ MINERALS TAB 1 TABLET: TAB at 14:02

## 2020-10-25 RX ADMIN — OXYCODONE HYDROCHLORIDE AND ACETAMINOPHEN 500 MG: 500 TABLET ORAL at 14:02

## 2020-10-25 RX ADMIN — Medication 100 MG: at 14:02

## 2020-10-25 RX ADMIN — CITALOPRAM HYDROBROMIDE 20 MG: 20 TABLET ORAL at 14:01

## 2020-10-25 RX ADMIN — APIXABAN 2.5 MG: 2.5 TABLET, FILM COATED ORAL at 14:02

## 2020-10-25 RX ADMIN — MAGNESIUM OXIDE 400 MG: 400 TABLET ORAL at 14:01

## 2020-10-25 RX ADMIN — Medication 9 MG: at 22:00

## 2020-10-25 RX ADMIN — Medication 9 MG: at 00:10

## 2020-10-25 RX ADMIN — APIXABAN 2.5 MG: 2.5 TABLET, FILM COATED ORAL at 17:52

## 2020-10-25 RX ADMIN — FUROSEMIDE 20 MG: 20 TABLET ORAL at 14:02

## 2020-10-25 RX ADMIN — LISINOPRIL 5 MG: 5 TABLET ORAL at 14:02

## 2020-10-25 NOTE — PROGRESS NOTES
Bedside and Verbal shift change report given to Teto Olivier RN  (oncoming nurse) by Ricky Lopez LPN (offgoing nurse). Report included the following information SBAR, Kardex, Procedure Summary, Intake/Output, MAR, Recent Results, Med Rec Status, Quality Measures and Dual Neuro Assessment.

## 2020-10-25 NOTE — PROGRESS NOTES
Progress Note    Patient: Alva Marie MRN: [id number]     YOB: 1931  Age: 80 y.o. Sex: female      Admit Date: 10/21/2020    LOS: 3 days     80year old  female from  seen for + Covid result. On assessment, patient asleep in bed, aroused to verbal stimuli, alert and oriented X 1. No signs of distress, discomfort, or pain. Vitals remained stable and no acute events overnight. Pt currently satting 98% on RA. BP elevated at 151/89 increased Lisinopril to 10 mg daily. Will continue to assess patient. Unable to discuss plan of care with pt due to dementia. Subjective:     Unable to assess. Objective:     Vitals:    10/24/20 1247 10/24/20 1626 10/24/20 2000 10/25/20 0940   BP: (!) 151/89 139/81 (!) 154/95 130/67   Pulse: 71 79 (!) 114 60   Resp: 14 14 14 18   Temp: 97.6 °F (36.4 °C) 97.4 °F (36.3 °C) 97.7 °F (36.5 °C) 98.1 °F (36.7 °C)   SpO2: 99% 96% 91% 98%   Weight:       Height:            Intake and Output:  Current Shift: No intake/output data recorded. Last three shifts: 10/23 1901 - 10/25 0700  In: 380 [P.O.:380]  Out: -     Physical Exam:   - GENERAL: Alert and oriented x 1. No acute distress. Cachetic. Very thin ill appearing.     - HEENT: EOMI. Anicteric. PERRLA,Moist mucous membranes. No scleral icterus. No cervical lymphadenopathy. Oropharynx moist without any lesions    -NECK:  No tracheal deviation, no JVD, no significant lymphadenopathy, no thyromegaly noted. - LUNGS: Clear but diminished to auscultation bilaterally. No accessory muscle use. Chest symmetrical, No wheezing, rales, rhonchi noted. Appropriate respiratory effort.     - CARDIOVASCULAR: Regular rate and rhythm. No murmur, rubs, gallops, No edema appreciated. S1 & S2 audible. - ABDOMEN: Soft, non-tender and non-distended. No palpable masses. , lesions, hepatomegaly. Bowels active X4 quadrants.      - SKIN: Warm, dry, intact, scattered bruising, protruding cancerous skin lesion to right face,no  rashes noted. Color appropriate for ethnicity.     - MUSCULOSKELETAL: Unable to assess.    - NEUROLOGIC: Unable to assess.    - PSYCHIATRIC: Calm & Cooperative. Appropriate mood and affect. Lab/Data Review:  No results found for this or any previous visit (from the past 12 hour(s)).        Assessment/Plan:   * COVID-19  Admit to inpatient  No O2 demand, continue to monitor  Pt asymptomatic, therefore, will hold COVID-specific supplements and initiate as needed  Supportive care  Continue Isolation precautions    Essential (primary) hypertension  Resume home regimen accordingly  Start Lisinopril 10mg daily    Dementia without behavioral disturbance ([address]  Monitor and provide supportive care    Paroxysmal atrial fibrillation ([address]  Resume home regimen to include Eliquis and monitor    Signed By: Kait Tang NP     October 25, 2020

## 2020-10-25 NOTE — PROGRESS NOTES
Problem: Falls - Risk of  Goal: *Absence of Falls  Description: Document Florencia Vidal Fall Risk and appropriate interventions in the flowsheet. Outcome: Progressing Towards Goal  Note: Fall Risk Interventions:       Mentation Interventions: Reorient patient                        Problem: Patient Education: Go to Patient Education Activity  Goal: Patient/Family Education  Outcome: Progressing Towards Goal     Problem: Pressure Injury - Risk of  Goal: *Prevention of pressure injury  Description: Document Edward Scale and appropriate interventions in the flowsheet. Outcome: Progressing Towards Goal  Note: Pressure Injury Interventions:  Sensory Interventions: Float heels, Keep linens dry and wrinkle-free, Minimize linen layers, Turn and reposition approx. every two hours (pillows and wedges if needed)    Moisture Interventions: Apply protective barrier, creams and emollients, Absorbent underpads, Minimize layers, Moisture barrier    Activity Interventions: Assess need for specialty bed    Mobility Interventions: HOB 30 degrees or less, Float heels, Turn and reposition approx.  every two hours(pillow and wedges)    Nutrition Interventions: Document food/fluid/supplement intake    Friction and Shear Interventions: Minimize layers, HOB 30 degrees or less, Apply protective barrier, creams and emollients                Problem: Patient Education: Go to Patient Education Activity  Goal: Patient/Family Education  Outcome: Progressing Towards Goal     Problem: Nutrition Deficit  Goal: *Optimize nutritional status  Outcome: Progressing Towards Goal

## 2020-10-25 NOTE — PROGRESS NOTES
Bedside shift change report given to Levonia Gitelman RN (oncoming nurse) by Ira Millan RN (offgoing nurse). Report included the following information SBAR.

## 2020-10-26 PROCEDURE — 74011250637 HC RX REV CODE- 250/637: Performed by: NURSE PRACTITIONER

## 2020-10-26 PROCEDURE — 65270000029 HC RM PRIVATE

## 2020-10-26 RX ADMIN — Medication 1 TABLET: at 11:03

## 2020-10-26 RX ADMIN — LISINOPRIL 10 MG: 5 TABLET ORAL at 11:04

## 2020-10-26 RX ADMIN — OXYCODONE HYDROCHLORIDE AND ACETAMINOPHEN 500 MG: 500 TABLET ORAL at 11:03

## 2020-10-26 RX ADMIN — FUROSEMIDE 20 MG: 20 TABLET ORAL at 11:04

## 2020-10-26 RX ADMIN — MULTIPLE VITAMINS W/ MINERALS TAB 1 TABLET: TAB at 11:03

## 2020-10-26 RX ADMIN — ATORVASTATIN CALCIUM 10 MG: 10 TABLET, FILM COATED ORAL at 11:03

## 2020-10-26 RX ADMIN — CITALOPRAM HYDROBROMIDE 20 MG: 20 TABLET ORAL at 11:03

## 2020-10-26 RX ADMIN — Medication 100 MG: at 11:03

## 2020-10-26 RX ADMIN — MAGNESIUM OXIDE 400 MG: 400 TABLET ORAL at 11:03

## 2020-10-26 RX ADMIN — APIXABAN 2.5 MG: 2.5 TABLET, FILM COATED ORAL at 18:36

## 2020-10-26 RX ADMIN — APIXABAN 2.5 MG: 2.5 TABLET, FILM COATED ORAL at 11:03

## 2020-10-26 NOTE — PROGRESS NOTES
Progress Note    Patient: Karrie Joe MRN: [id number]     YOB: 1931  Age: 80 y.o. Sex: female      Admit Date: 10/21/2020    LOS: 11 days     80year old  female from  admitted for +ve Covid result. Unable to obtain ROS due to advanced dementia. Subjective:     Unable to assess. Objective:     Vitals:    10/25/20 1750 10/25/20 1959 10/26/20 0000 10/26/20 0400   BP: 139/82 128/73 134/77 139/74   Pulse: 70 87 (!) 59 77   Resp: 20 20 20 20   Temp: 97.5 °F (36.4 °C) 97.1 °F (36.2 °C) 98.1 °F (36.7 °C) 97.7 °F (36.5 °C)   SpO2: 100% 95% 96% 94%   Weight:       Height:            Intake and Output:  Current Shift: No intake/output data recorded. Last three shifts: 10/24 1901 - 10/26 0700  In: 300 [P.O.:300]  Out: -     Physical Exam:   - GENERAL: Alert and oriented to self only. No acute distress. Cachetic. Very thin ill appearing.     - HEENT: EOMI. Anicteric. PERRLA,  Dry mucous membranes. No scleral icterus. No cervical lymphadenopathy. Oropharynx moist without any lesions    -NECK:  No tracheal deviation, no JVD, no significant lymphadenopathy, no thyromegaly noted. - LUNGS: Diminished to auscultation bilaterally. No accessory muscle use. Chest symmetrical, No wheezing, rales, rhonchi noted. Appropriate respiratory effort.     - CARDIOVASCULAR: Regular rate and rhythm. No murmur, rubs, gallops, No edema appreciated. S1 & S2 audible. - ABDOMEN: Soft, flat non-tender and non-distended. No palpable masses. , lesions, hepatomegaly. Bowels active X4 quadrants. - SKIN: Warm, dry, intact, scattered bruising, chronic protruding cancerous skin lesion to right face,no  rashes noted. Color appropriate for ethnicity.     - MUSCULOSKELETAL: Unable to assess.    - NEUROLOGIC: Oriented to self only. Lab/Data Review:  No results found for this or any previous visit (from the past 12 hour(s)).        Assessment/Plan:       #1: COVID 19 Positive  -[place] resident.  -On room air, with o2sats %. -Continue vit c/vit d/zinc and supportive care. #2: Hypertension:  Chronic condition, on lisinopril. #3: Dementia:  -Chronic. Stable. #4: Afib:  -Chronic condition, on Eliquis.        VTE Prophylaxis: Eliquis  Code Status: DNR  Signed By: Fabian Cook NP     October 26, 2020

## 2020-10-26 NOTE — PROGRESS NOTES
Problem: Falls - Risk of  Goal: *Absence of Falls  Description: Document [place] Fall Risk and appropriate interventions in the flowsheet. Outcome: Progressing Towards Goal  Note: Fall Risk Interventions:       Mentation Interventions: Reorient patient         Elimination Interventions: Toileting schedule/hourly rounds              Problem: Patient Education: Go to Patient Education Activity  Goal: Patient/Family Education  Outcome: Progressing Towards Goal     Problem: Pressure Injury - Risk of  Goal: *Prevention of pressure injury  Description: Document Edward Scale and appropriate interventions in the flowsheet. Outcome: Progressing Towards Goal  Note: Pressure Injury Interventions:  Sensory Interventions: Float heels, Keep linens dry and wrinkle-free, Turn and reposition approx.  every two hours (pillows and wedges if needed)    Moisture Interventions: Absorbent underpads, Apply protective barrier, creams and emollients    Activity Interventions: Assess need for specialty bed    Mobility Interventions: HOB 30 degrees or less    Nutrition Interventions: Document food/fluid/supplement intake, Offer support with meals,snacks and hydration    Friction and Shear Interventions: Apply protective barrier, creams and emollients, Lift sheet, HOB 30 degrees or less, Minimize layers                Problem: Patient Education: Go to Patient Education Activity  Goal: Patient/Family Education  Outcome: Progressing Towards Goal     Problem: Nutrition Deficit  Goal: *Optimize nutritional status  Outcome: Progressing Towards Goal

## 2020-10-26 NOTE — PROGRESS NOTES
Pt had uneventful shift, she did not eat or drink much. We have added thickener in her water due to mild coughing when drinking thin liquids. Changed brief, vaginal mass noted- prolapse. No s/s of pain, no problems noted.

## 2020-10-26 NOTE — PROGRESS NOTES
Physician Progress Note      PATIENTBe Yoo  CSN #:                  [id number]  :                       1931  ADMIT DATE:       10/21/2020 7:09 PM  [address] [place] DATE:  RESPONDING  PROVIDER #:        Ivanna Bella NP          QUERY TEXT:    Dear Hospitalist,    Pt admitted with COVID-19. Noted documentation of Moderate Malnutrition on Dietitian's Progress note dated 10/22. Please clarify if you concur with this diagnosis. If possible, please document in progress notes and discharge summary:    The medical record reflects the following:    Risk Factors: Dementia    Clinical Indicators: Per dietitian's assessment 10/22:  -  Malnutrition Assessment: Malnutrition Status: Moderate malnutrition  -  Inadequate protein-energy intake, Moderate malnutrition, Underweight, Altered nutrition-related lab values related to biting/chewing (masticatory) difficulty, early satiety as evidenced by BMI, severe muscle loss, lab values    Treatment: Per dietitian: Mechanical soft diet with Ensure Enlive at breakfast and Magic Cup at lunch and supper    Thank you,  Jessie Justin RN, BSN -CDI-  Please email me with any questions or concerns regarding this query at [email or web address]  Options provided:  -- Moderate malnutrition confirmed POA  -- Moderate malnutrition ruled out (include corresponding diagnosis for patient?s clinical picture and treatment), please include corresponding diagnosis for patient?s clinical picture and treatment. -- Other - I will add my own diagnosis  -- Disagree - Not applicable / Not valid  -- Disagree - Clinically unable to determine / Unknown  -- Refer to Clinical Documentation Reviewer    PROVIDER RESPONSE TEXT:    The diagnosis of moderate malnutrition is confirmed as POA.     Query created by: Yamilex Tony on 10/26/2020 2:35 PM      Electronically signed by:  Ivanna Bella NP 10/26/2020 3:22 PM

## 2020-10-27 LAB
ANION GAP SERPL CALC-SCNC: 13 MMOL/L
BASOPHILS # BLD: 0 K/UL (ref 0–0.1)
BASOPHILS NFR BLD: 0 % (ref 0–2)
BUN SERPL-MCNC: 28 MG/DL (ref 9–21)
BUN/CREAT SERPL: 56
CA-I BLD-MCNC: 9.2 MG/DL (ref 8.5–10.5)
CHLORIDE SERPL-SCNC: 97 MMOL/L (ref 94–111)
CO2 SERPL-SCNC: 28 MMOL/L (ref 21–33)
CREAT SERPL-MCNC: 0.5 MG/DL (ref 0.7–1.2)
EOSINOPHIL # BLD: 0 K/UL (ref 0–0.4)
EOSINOPHIL NFR BLD: 0 % (ref 0–5)
ERYTHROCYTE [DISTWIDTH] IN BLOOD BY AUTOMATED COUNT: 15.5 % (ref 11.6–14.5)
GLUCOSE SERPL-MCNC: 64 MG/DL (ref 70–110)
HCT VFR BLD AUTO: 42.5 % (ref 35–45)
HGB BLD-MCNC: 13.8 G/DL (ref 12–16)
IMM GRANULOCYTES # BLD AUTO: 0 K/UL
IMM GRANULOCYTES NFR BLD AUTO: 0 %
LYMPHOCYTES # BLD: 1.1 K/UL (ref 0.9–3.6)
LYMPHOCYTES NFR BLD: 17 % (ref 21–52)
MCH RBC QN AUTO: 29.7 PG (ref 24–34)
MCHC RBC AUTO-ENTMCNC: 32.5 G/DL (ref 31–37)
MCV RBC AUTO: 91.4 FL (ref 74–97)
MONOCYTES # BLD: 0.4 K/UL (ref 0.05–1.2)
MONOCYTES NFR BLD: 5 % (ref 3–10)
NEUTS SEG # BLD: 5.3 K/UL (ref 1.8–8)
NEUTS SEG NFR BLD: 78 % (ref 40–73)
PLATELET # BLD AUTO: 201 K/UL (ref 135–420)
PMV BLD AUTO: 11.4 FL (ref 9.2–11.8)
POTASSIUM SERPL-SCNC: 5 MMOL/L (ref 3.2–5.1)
RBC # BLD AUTO: 4.65 M/UL (ref 4.2–5.3)
SODIUM SERPL-SCNC: 138 MMOL/L (ref 135–145)
WBC # BLD AUTO: 6.8 K/UL (ref 4.6–13.2)

## 2020-10-27 PROCEDURE — 36415 COLL VENOUS BLD VENIPUNCTURE: CPT

## 2020-10-27 PROCEDURE — 74011250637 HC RX REV CODE- 250/637: Performed by: INTERNAL MEDICINE

## 2020-10-27 PROCEDURE — 85025 COMPLETE CBC W/AUTO DIFF WBC: CPT

## 2020-10-27 PROCEDURE — 65270000029 HC RM PRIVATE

## 2020-10-27 PROCEDURE — 80048 BASIC METABOLIC PNL TOTAL CA: CPT

## 2020-10-27 PROCEDURE — 74011250637 HC RX REV CODE- 250/637: Performed by: NURSE PRACTITIONER

## 2020-10-27 RX ADMIN — LISINOPRIL 10 MG: 5 TABLET ORAL at 11:28

## 2020-10-27 RX ADMIN — APIXABAN 2.5 MG: 2.5 TABLET, FILM COATED ORAL at 11:30

## 2020-10-27 RX ADMIN — OXYCODONE HYDROCHLORIDE AND ACETAMINOPHEN 500 MG: 500 TABLET ORAL at 11:29

## 2020-10-27 RX ADMIN — MULTIPLE VITAMINS W/ MINERALS TAB 1 TABLET: TAB at 11:28

## 2020-10-27 RX ADMIN — CITALOPRAM HYDROBROMIDE 20 MG: 20 TABLET ORAL at 11:28

## 2020-10-27 RX ADMIN — Medication 1 TABLET: at 11:29

## 2020-10-27 RX ADMIN — MAGNESIUM OXIDE 400 MG: 400 TABLET ORAL at 11:28

## 2020-10-27 RX ADMIN — Medication 9 MG: at 23:32

## 2020-10-27 RX ADMIN — ATORVASTATIN CALCIUM 10 MG: 10 TABLET, FILM COATED ORAL at 11:28

## 2020-10-27 RX ADMIN — FUROSEMIDE 20 MG: 20 TABLET ORAL at 11:28

## 2020-10-27 RX ADMIN — APIXABAN 2.5 MG: 2.5 TABLET, FILM COATED ORAL at 18:13

## 2020-10-27 RX ADMIN — Medication 100 MG: at 11:28

## 2020-10-27 NOTE — PROGRESS NOTES
Progress Note    Patient: Piotr Peoples MRN: [id number]     YOB: 1931  Age: 80 y.o. Sex: female      Admit Date: 10/21/2020    LOS: 10 days     80year old  female from  seen for + Covid result. On assessment, patient asleep in bed, aroused to verbal stimuli, alert and oriented X 1. No signs of distress, discomfort, or pain. Vitals remained stable and no acute events overnight. Pt currently satting 90% on RA. Will continue to assess patient. Unable to discuss plan of care with pt due to dementia. Subjective:     Unable to assess. Objective:     Vitals:    10/26/20 1509 10/26/20 2246 10/27/20 0318 10/27/20 0730   BP: 134/82 123/66 139/78 137/85   Pulse: 74 68 69 62   Resp: 17 16 17 16   Temp: 98 °F (36.7 °C) (!) 96.4 °F (35.8 °C) (!) 96.3 °F (35.7 °C) 97.3 °F (36.3 °C)   SpO2: 96% 98%  90%   Weight:       Height:            Intake and Output:  Current Shift: No intake/output data recorded. Last three shifts: 10/25 1901 - 10/27 0700  In: 450 [P.O.:450]  Out: -     Physical Exam:   - GENERAL: Alert and oriented x 1. No acute distress. Cachetic. Very thin ill appearing.     - HEENT: EOMI. Anicteric. PERRLA,Moist mucous membranes. No scleral icterus. No cervical lymphadenopathy. Oropharynx moist without any lesions    -NECK:  No tracheal deviation, no JVD, no significant lymphadenopathy, no thyromegaly noted. - LUNGS: Clear but diminished to auscultation bilaterally. No accessory muscle use. Chest symmetrical, No wheezing, rales, rhonchi noted. Appropriate respiratory effort.     - CARDIOVASCULAR: Regular rate and rhythm. No murmur, rubs, gallops, No edema appreciated. S1 & S2 audible. - ABDOMEN: Soft, non-tender and non-distended. No palpable masses. , lesions, hepatomegaly. Bowels active X4 quadrants. - SKIN: Warm, dry, intact, scattered bruising, protruding cancerous skin lesion to right face,no  rashes noted. Color appropriate for ethnicity.      - MUSCULOSKELETAL: Unable to assess.    - NEUROLOGIC: Unable to assess.    - PSYCHIATRIC: Calm & Cooperative. Appropriate mood and affect.     Assessment/Plan:   * COVID-19  Admit to inpatient  No O2 demand, continue to monitor  Pt asymptomatic, therefore, will hold COVID-specific supplements and initiate as needed  Supportive care  Continue Isolation precautions    Essential (primary) hypertension  Resume home regimen accordingly  Continue Lisinopril 10mg daily    Dementia without behavioral disturbance (HCC)  Monitor and provide supportive care    Paroxysmal atrial fibrillation ([facility] [address]  Resume home regimen to include Eliquis and monitor      Signed By: Michelle Silva NP     October 27, 2020

## 2020-10-27 NOTE — PROGRESS NOTES
Problem: Falls - Risk of  Goal: *Absence of Falls  Description: Document Briana Pillow Fall Risk and appropriate interventions in the flowsheet.   Outcome: Progressing Towards Goal  Note: Fall Risk Interventions:       Mentation Interventions: Bed/chair exit alarm         Elimination Interventions: Bed/chair exit alarm

## 2020-10-27 NOTE — PROGRESS NOTES
Verbal shift change report given to Mario Santos (oncoming nurse) by Phu Baumann LPN (offgoing nurse). Report included the following information Kardex.

## 2020-10-27 NOTE — PROGRESS NOTES
Pt had an uneventful shift. Brief changed x2. Pt is NOT eating well, she is drinking water with thickener. Mepilex changed on chest and sacrum (prophylaxis reasons) pt is confused.  No problems noted

## 2020-10-28 PROCEDURE — 74011250637 HC RX REV CODE- 250/637: Performed by: NURSE PRACTITIONER

## 2020-10-28 PROCEDURE — 74011250637 HC RX REV CODE- 250/637: Performed by: INTERNAL MEDICINE

## 2020-10-28 PROCEDURE — 65270000029 HC RM PRIVATE

## 2020-10-28 RX ADMIN — APIXABAN 2.5 MG: 2.5 TABLET, FILM COATED ORAL at 19:18

## 2020-10-28 RX ADMIN — OXYCODONE HYDROCHLORIDE AND ACETAMINOPHEN 500 MG: 500 TABLET ORAL at 10:47

## 2020-10-28 RX ADMIN — LISINOPRIL 10 MG: 5 TABLET ORAL at 10:47

## 2020-10-28 RX ADMIN — MAGNESIUM OXIDE 400 MG: 400 TABLET ORAL at 10:47

## 2020-10-28 RX ADMIN — FUROSEMIDE 20 MG: 20 TABLET ORAL at 10:47

## 2020-10-28 RX ADMIN — Medication 1 TABLET: at 10:47

## 2020-10-28 RX ADMIN — MULTIPLE VITAMINS W/ MINERALS TAB 1 TABLET: TAB at 10:47

## 2020-10-28 RX ADMIN — Medication 100 MG: at 10:47

## 2020-10-28 RX ADMIN — Medication 9 MG: at 21:30

## 2020-10-28 RX ADMIN — CITALOPRAM HYDROBROMIDE 20 MG: 20 TABLET ORAL at 10:47

## 2020-10-28 RX ADMIN — APIXABAN 2.5 MG: 2.5 TABLET, FILM COATED ORAL at 10:47

## 2020-10-28 RX ADMIN — ATORVASTATIN CALCIUM 10 MG: 10 TABLET, FILM COATED ORAL at 10:47

## 2020-10-28 NOTE — PROGRESS NOTES
Nutrition Note    Suboptimal calorie protein intake - no improvement AEB <25% meals consumed, nausea, reduced appetite. New intervention: change supplement Magic Cup to Ensure Enlive TID. Pt will drink more than consume solid foods at present. Ensure Enlive if 100% consumed would provide 1060 calories and 60 gm protein - 75% calorie and protein needs. Electrolytes WNL.       Electronically signed by Linda Deleon on 10/28/2020 at 12:33 PM    Contact: [phone]

## 2020-10-28 NOTE — PROGRESS NOTES
Rounding and medication pass completed. T&P for comfort and pressure relief. Will continue to monitor. CB in reach.

## 2020-10-28 NOTE — PROGRESS NOTES
Breakfast provided to patient. Pt refused all except for Ensure. Pt drank all of ensure. Turned and repositioned. Call bell in reach.

## 2020-10-28 NOTE — PROGRESS NOTES
Progress Note    Patient: Dai Pope MRN: [id number]     YOB: 1931  Age: 80 y.o. Sex: female      Admit Date: 10/21/2020    LOS: 9 days     80year old  female from  seen for + Covid result. On assessment, patient awake in bed alert and oriented X 2. Pt appears more alert and interactive today. No signs of distress, discomfort, or pain. Vitals remained stable and no acute events overnight. Pt currently satting 93% on RA. Will continue to assess patient. Unable to discuss plan of care with pt due to dementia. Subjective:     Unable to assess. Objective:     Vitals:    10/27/20 0730 10/27/20 1735 10/28/20 0241 10/28/20 1216   BP: 137/85 117/62 115/78    Pulse: 62 78 81    Resp: 16 14 18    Temp: 97.3 °F (36.3 °C) 96.8 °F (36 °C) 97 °F (36.1 °C)    SpO2: 90% 96% 97%    Weight:       Height:    5' 3\" (1.6 m)        Intake and Output:  Current Shift: No intake/output data recorded. Last three shifts: 10/26 1901 - 10/28 0700  In: 50 [P.O.:50]  Out: -     Physical Exam:   - GENERAL: Alert and oriented x 2. No acute distress. Cachetic. Very thin ill appearing.     - HEENT: EOMI. Anicteric. PERRLA,Moist mucous membranes. No scleral icterus. No cervical lymphadenopathy. Oropharynx moist without any lesions    -NECK:  No tracheal deviation, no JVD, no significant lymphadenopathy, no thyromegaly noted. - LUNGS: Clear but diminished to auscultation bilaterally. No accessory muscle use. Chest symmetrical, No wheezing, rales, rhonchi noted. Appropriate respiratory effort.     - CARDIOVASCULAR: Regular rate and rhythm. No murmur, rubs, gallops, No edema appreciated. S1 & S2 audible. - ABDOMEN: Soft, non-tender and non-distended. No palpable masses. , lesions, hepatomegaly. Bowels active X4 quadrants. - SKIN: Warm, dry, intact, scattered bruising, protruding cancerous skin lesion to right face,no  rashes noted.  Color appropriate for ethnicity.     - MUSCULOSKELETAL: Unable to assess.    - NEUROLOGIC: Unable to assess.    - PSYCHIATRIC: Calm & Cooperative. Appropriate mood and affect.     Assessment/Plan:   * COVID-19  Admit to inpatient  No O2 demand, continue to monitor  Pt asymptomatic, therefore, will hold COVID-specific supplements and initiate as needed  Supportive care  Continue Isolation precautions    Essential (primary) hypertension  Resume home regimen accordingly  Continue Lisinopril 10mg daily    Dementia without behavioral disturbance (HCC)  Monitor and provide supportive care    Paroxysmal atrial fibrillation ([facility] [address]  Resume home regimen to include Eliquis and monitor      Signed By: Debbie Rachel NP     October 28, 2020

## 2020-10-28 NOTE — PROGRESS NOTES
Rounding and assessment completed. Resting quietly in bed at this. Safety measures in place. T&P for comfort and pressure relief. Will continue to monitor.

## 2020-10-28 NOTE — PROGRESS NOTES
Problem: Nutrition Deficit  Goal: *Optimize nutritional status  Outcome: Not Progressing Towards Goal   Nutrition Note    Suboptimal calorie protein intake - no improvement AEB <25% meals consumed, nausea, reduced appetite. New intervention: change supplement Magic Cup to Ensure Enlive TID. Pt will drink more than consume solid foods at present. Ensure Enlive if 100% consumed would provide 1060 calories and 60 gm protein - 75% calorie and protein needs. Electrolytes WNL.          Electronically signed by Shena You on 10/28/2020 at 12:39 PM    Contact: [phone]

## 2020-10-29 VITALS
BODY MASS INDEX: 20.55 KG/M2 | SYSTOLIC BLOOD PRESSURE: 135 MMHG | WEIGHT: 116 LBS | HEART RATE: 87 BPM | TEMPERATURE: 97.2 F | RESPIRATION RATE: 14 BRPM | OXYGEN SATURATION: 96 % | HEIGHT: 63 IN | DIASTOLIC BLOOD PRESSURE: 71 MMHG

## 2020-10-29 PROCEDURE — 74011250637 HC RX REV CODE- 250/637: Performed by: NURSE PRACTITIONER

## 2020-10-29 RX ADMIN — LISINOPRIL 10 MG: 5 TABLET ORAL at 10:21

## 2020-10-29 RX ADMIN — ATORVASTATIN CALCIUM 10 MG: 10 TABLET, FILM COATED ORAL at 11:07

## 2020-10-29 RX ADMIN — CITALOPRAM HYDROBROMIDE 20 MG: 20 TABLET ORAL at 11:09

## 2020-10-29 RX ADMIN — MAGNESIUM OXIDE 400 MG: 400 TABLET ORAL at 11:08

## 2020-10-29 RX ADMIN — OXYCODONE HYDROCHLORIDE AND ACETAMINOPHEN 500 MG: 500 TABLET ORAL at 11:08

## 2020-10-29 RX ADMIN — MULTIPLE VITAMINS W/ MINERALS TAB 1 TABLET: TAB at 11:08

## 2020-10-29 RX ADMIN — APIXABAN 2.5 MG: 2.5 TABLET, FILM COATED ORAL at 11:09

## 2020-10-29 RX ADMIN — FUROSEMIDE 20 MG: 20 TABLET ORAL at 11:07

## 2020-10-29 RX ADMIN — Medication 1 TABLET: at 11:08

## 2020-10-29 RX ADMIN — Medication 100 MG: at 11:07

## 2020-10-29 NOTE — PROGRESS NOTES
Complete bed bath given, pt. Tolerated well. Pt. Took small sips of water.  Bed in lowest position, CBWR

## 2020-10-29 NOTE — PROGRESS NOTES
Rec'd care of patient resting in bed. Lung sounds clear but diminished. She did not take in a lot of food during breakfast but did drink half an ensure. BS present, brief intact and dry. No problems noted.

## 2020-10-29 NOTE — PROGRESS NOTES
Pt. Has slept soundly through the night, nad noted. Pt. Remains on RA with no SOB, VS stable. Positioned pt. With pillows through the night for pressure reduction and comfort.  Bed in lowest position, CBWR

## 2020-10-29 NOTE — PROGRESS NOTES
Assessment completed. Brief changed of incontinent episode of urine. Pt. Resting quietly in bed watching TV, bed in lowest position.  CBWR

## 2020-10-29 NOTE — PROGRESS NOTES
Problem: Falls - Risk of  Goal: *Absence of Falls  Description: Document India Zaragoza Fall Risk and appropriate interventions in the flowsheet. 10/29/2020 1159 by Vita Lino LPN  Outcome: Resolved/Met  Note: Fall Risk Interventions:       Mentation Interventions: Bed/chair exit alarm         Elimination Interventions: Bed/chair exit alarm           10/29/2020 1155 by Vita Lino LPN  Outcome: Progressing Towards Goal  Note: Fall Risk Interventions:       Mentation Interventions: Bed/chair exit alarm         Elimination Interventions: Bed/chair exit alarm              Problem: Patient Education: Go to Patient Education Activity  Goal: Patient/Family Education  10/29/2020 1159 by Vita Lino LPN  Outcome: Resolved/Met  10/29/2020 1155 by Vita Lino LPN  Outcome: Progressing Towards Goal     Problem: Pressure Injury - Risk of  Goal: *Prevention of pressure injury  Description: Document Edward Scale and appropriate interventions in the flowsheet.   10/29/2020 1159 by Vita Lino LPN  Outcome: Resolved/Met  Note: Pressure Injury Interventions:  Sensory Interventions: Avoid rigorous massage over bony prominences, Float heels, Keep linens dry and wrinkle-free    Moisture Interventions: Apply protective barrier, creams and emollients, Check for incontinence Q2 hours and as needed    Activity Interventions: Assess need for specialty bed    Mobility Interventions: Assess need for specialty bed, Float heels, HOB 30 degrees or less    Nutrition Interventions: Document food/fluid/supplement intake, Offer support with meals,snacks and hydration    Friction and Shear Interventions: Apply protective barrier, creams and emollients, Lift sheet, HOB 30 degrees or less, Minimize layers             10/29/2020 1155 by Vita Lino LPN  Outcome: Progressing Towards Goal  Note: Pressure Injury Interventions:  Sensory Interventions: Avoid rigorous massage over bony prominences, Float heels, Keep linens dry and wrinkle-free    Moisture Interventions: Apply protective barrier, creams and emollients, Check for incontinence Q2 hours and as needed    Activity Interventions: Assess need for specialty bed    Mobility Interventions: Assess need for specialty bed, Float heels, HOB 30 degrees or less    Nutrition Interventions: Document food/fluid/supplement intake, Offer support with meals,snacks and hydration    Friction and Shear Interventions: Apply protective barrier, creams and emollients, Lift sheet, HOB 30 degrees or less, Minimize layers                Problem: Patient Education: Go to Patient Education Activity  Goal: Patient/Family Education  10/29/2020 1159 by Sebastien Spence LPN  Outcome: Resolved/Met  10/29/2020 1155 by Sebastien Spence LPN  Outcome: Progressing Towards Goal     Problem: Nutrition Deficit  Goal: *Optimize nutritional status  10/29/2020 1159 by Sebastien Spence LPN  Outcome: Resolved/Met  10/29/2020 1155 by Sebastien Spence LPN  Outcome: Progressing Towards Goal

## 2020-10-29 NOTE — DISCHARGE SUMMARY
Discharge Summary     Patient: Karrie Joe MRN: [id number]     YOB: 1931  Age: 80 y.o. Sex: female       Admit Date: 10/21/2020    Discharge Date: 10/29/2020      Admission Diagnoses: COVID-19 [U07.1]    Discharge Diagnoses:   Problem List as of 10/29/2020 Date Reviewed: 10/25/2020          Codes Class Noted - Resolved    * (Principal) COVID-19 ICD-10-CM: U07.1  ICD-9-CM: 079.89  10/21/2020 - Present        Paroxysmal atrial fibrillation ([facility] ICD-10-CM: I48.0  ICD-9-CM: 427.31  8/31/2020 - Present        Dementia without behavioral disturbance ([facility] ICD-10-CM: F03.90  ICD-9-CM: 294.20  8/31/2020 - Present        Essential (primary) hypertension ICD-10-CM: I10  ICD-9-CM: 401.9  8/31/2020 - Present        Malignant neoplasm of skin ICD-10-CM: C44.90  ICD-9-CM: 173.90  8/31/2020 - Present               Hospital Course: Karrie Joe is a 80 y.o. female with a history of dementia, HTN who presented to [facility] from [facility] diagnosis of positive COVID-19. Patient unable to provide any information secondary to dementia. Per nursing report, there are no physical complaints. She was  admitted for further evaluation and treatment of COVID-19. On assessment, patient asleep in bed aroused to verbal stimuli, alert and oriented X 2. No signs of distress, discomfort, or pain. Vitals remained stable and no acute events overnight. Pt currently satting 95% on RA. Will continue to assess patient.      Unable to discuss plan of care with pt due to dementia. Subjective:      Unable to assess. Physical Exam:   - GENERAL: Alert and oriented x 2. No acute distress. Cachetic. Very thin ill appearing.     - HEENT: EOMI. Anicteric. PERRLA,Moist mucous membranes. No scleral icterus. No cervical lymphadenopathy. Oropharynx moist without any lesions     -NECK:  No tracheal deviation, no JVD, no significant lymphadenopathy, no thyromegaly noted.      - LUNGS: Clear but diminished to auscultation bilaterally. No accessory muscle use. Chest symmetrical, No wheezing, rales, rhonchi noted. Appropriate respiratory effort.     - CARDIOVASCULAR: Regular rate and rhythm. No murmur, rubs, gallops, No edema appreciated. S1 & S2 audible. - ABDOMEN: Soft, non-tender and non-distended. No palpable masses. , lesions, hepatomegaly. Bowels active X4 quadrants.      - SKIN: Warm, dry, intact, scattered bruising, protruding cancerous skin lesion to right face,no  rashes noted. Color appropriate for ethnicity.     - MUSCULOSKELETAL: Unable to assess.    - NEUROLOGIC: Unable to assess.    - PSYCHIATRIC: Calm & Cooperative. Appropriate mood and affect.     Wt Readings from Last 3 Encounters:   10/22/20 52.6 kg (116 lb)     Temp Readings from Last 3 Encounters:   10/28/20 97.2 °F (36.2 °C)     BP Readings from Last 3 Encounters:   10/29/20 139/67     Pulse Readings from Last 3 Encounters:   10/29/20 87       Lab Results   Component Value Date/Time    WBC 6.8 10/27/2020 05:50 PM    HGB 13.8 10/27/2020 05:50 PM    HCT 42.5 10/27/2020 05:50 PM    PLATELET 802 32/85/7171 05:50 PM    MCV 91.4 10/27/2020 05:50 PM       Significant Diagnostic Studies:   No results found. Discharge Medications:   Current Discharge Medication List      CONTINUE these medications which have NOT CHANGED    Details   atorvastatin (LIPITOR) 10 mg tablet Take 10 mg by mouth daily. citalopram (CELEXA) 20 mg tablet Take 20 mg by mouth daily. furosemide (LASIX) 20 mg tablet Take 20 mg by mouth daily. magnesium oxide (MAG-OX) 400 mg tablet Take 400 mg by mouth daily. multivit,calc,mins/iron/folic (THERA M PLUS, FERROUS FUMARAT, PO) Take 1 Tab by mouth daily. ascorbic acid, vitamin C, (Vitamin C) 500 mg tablet Take 500 mg by mouth daily. zinc gluconate 100 mg tab Take 2 Each by mouth daily. apixaban (Eliquis) 2.5 mg tablet Take 2.5 mg by mouth two (2) times a day.       potassium chloride (Klor-Con M20) 20 mEq tablet Take 20 mEq by mouth two (2) times a day. cholecalciferol (Vitamin D3) (1000 Units /25 mcg) tablet Take 1,000 Units by mouth daily. melatonin 5 mg cap capsule Take 10 mg by mouth nightly. Disposition: long term care facility  Discharge Condition: Fair  Activity: Activity as Tolerated. Diet: Resume previous diet  Wound Care: None needed  Consults: None    Follow-up Appointments   Procedures    FOLLOW UP VISIT Appointment in: One Week Follow up with PCP in 1 week. Follow up with PCP in 1 week. Standing Status:   Standing     Number of Occurrences:   1     Order Specific Question:   Appointment in     Answer:    One Week       PLAN DURING HOSPITALIZATION:  * COVID-19  Admit to inpatient  No O2 demand, continue to monitor  Pt asymptomatic, therefore, will hold COVID-specific supplements and initiate as needed  Supportive care  Continue Isolation precautions    Essential (primary) hypertension  Resume home regimen accordingly  Continue Lisinopril 10mg daily    Dementia without behavioral disturbance (HCC)  Monitor and provide supportive care    Paroxysmal atrial fibrillation ([address]  Resume home regimen to include Eliquis and monitor      Signed By: Kate Benitez NP     October 29, 2020

## 2020-10-29 NOTE — PROGRESS NOTES
Problem: Falls - Risk of  Goal: *Absence of Falls  Description: Document Uriah Ernandez Fall Risk and appropriate interventions in the flowsheet. Outcome: Progressing Towards Goal  Note: Fall Risk Interventions:       Mentation Interventions: Bed/chair exit alarm         Elimination Interventions: Bed/chair exit alarm              Problem: Patient Education: Go to Patient Education Activity  Goal: Patient/Family Education  Outcome: Progressing Towards Goal     Problem: Pressure Injury - Risk of  Goal: *Prevention of pressure injury  Description: Document Edward Scale and appropriate interventions in the flowsheet.   Outcome: Progressing Towards Goal  Note: Pressure Injury Interventions:  Sensory Interventions: Avoid rigorous massage over bony prominences, Float heels, Keep linens dry and wrinkle-free    Moisture Interventions: Apply protective barrier, creams and emollients, Check for incontinence Q2 hours and as needed    Activity Interventions: Assess need for specialty bed    Mobility Interventions: Assess need for specialty bed, Float heels, HOB 30 degrees or less    Nutrition Interventions: Document food/fluid/supplement intake, Offer support with meals,snacks and hydration    Friction and Shear Interventions: Apply protective barrier, creams and emollients, Lift sheet, HOB 30 degrees or less, Minimize layers                Problem: Patient Education: Go to Patient Education Activity  Goal: Patient/Family Education  Outcome: Progressing Towards Goal     Problem: Nutrition Deficit  Goal: *Optimize nutritional status  Outcome: Progressing Towards Goal

## 2022-11-09 NOTE — PROGRESS NOTES
Aishwarya Kiser is a 80 y.o. female who was seen by synchronous (real-time) audio-video technology on 8/20/2020 for No chief complaint on file. Assessment & Plan:   Diagnoses and all orders for this visit:    1. Paroxysmal atrial fibrillation (HCC)  -Chronic atrial fibrillation   -Continue with anticoagulant   -No change in medication at this time     2. History of CVA (cerebrovascular accident)  -Stable     3. Dementia without behavioral disturbance, unspecified dementia type ([facility]  - Stable   -No complaints from staff of worsening, they will continue to monitor   4. Essential (primary) hypertension  -Controlled , no medication adjustments at this time     5. Malignant neoplasm of skin  - Family continues to decline intervention at this time. Several lesions on her chest         I spent at least 10 minutes on this visit with this established patient. 712  Subjective:   Aishwarya Kiser is a 80 y.o. female who is seen for follow-up of hypertension, atrial fibrillation, dementia, and skin cancer, patient is a resident in [address]. Staff nurse is present today with patient to report how she is doing. Patient reports she is doing well, staff nurse agrees. Blood pressure is controlled, patient is eating well and sleeping well. No complaints of worsening mood or behavior. No complaints of shortness of breath or chest pains voiced by patient to staff. No falls. Prior to Admission medications    Not on File         Review of Systems   Constitutional: Negative for chills, fever and weight loss. Respiratory: Negative for cough. Cardiovascular: Negative for chest pain. Gastrointestinal: Negative for abdominal pain. Psychiatric/Behavioral: Negative for depression. The patient does not have insomnia. Physical Exam  Vitals signs and nursing note reviewed. Constitutional:       Comments: Physical exam was deferred due to COVID- 19 precautions as visit was completed via telemedicine. Phone report was given to Rupal Objective:   No flowsheet data found. General: alert, cooperative, no distress   Mental  status: normal mood, behavior, speech, dress, motor activity, and thought processes, able to follow commands   HENT: NCAT   Neck: no visualized mass   Resp: no respiratory distress   Neuro: no gross deficits   Skin: no discoloration or lesions of concern on visible areas   Psychiatric: normal affect, consistent with stated mood, no evidence of hallucinations     Additional exam findings: We discussed the expected course, resolution and complications of the diagnosis(es) in detail. Medication risks, benefits, costs, interactions, and alternatives were discussed as indicated. I advised her to contact the office if her condition worsens, changes or fails to improve as anticipated. She expressed understanding with the diagnosis(es) and plan. Aishwarya Kiser, who was evaluated through a patient-initiated, synchronous (real-time) audio-video encounter, and/or her healthcare decision maker, is aware that it is a billable service, with coverage as determined by her insurance carrier. She provided verbal consent to proceed: Yes, and patient identification was verified. It was conducted pursuant to the emergency declaration under the [facility], [address] [place] [address] authority and the [employer] and [employer] General Act. A caregiver was present when appropriate. Ability to conduct physical exam was limited. I was in the office. The patient was in nursing home.  Paula Ward NP